# Patient Record
Sex: FEMALE | Race: WHITE | Employment: OTHER | URBAN - METROPOLITAN AREA
[De-identification: names, ages, dates, MRNs, and addresses within clinical notes are randomized per-mention and may not be internally consistent; named-entity substitution may affect disease eponyms.]

---

## 2021-12-05 ENCOUNTER — HOSPITAL ENCOUNTER (EMERGENCY)
Facility: CLINIC | Age: 71
Discharge: HOME | End: 2021-12-05
Attending: EMERGENCY MEDICINE | Admitting: EMERGENCY MEDICINE
Payer: COMMERCIAL

## 2021-12-05 ENCOUNTER — APPOINTMENT (OUTPATIENT)
Dept: CT IMAGING | Facility: CLINIC | Age: 71
End: 2021-12-05
Attending: EMERGENCY MEDICINE
Payer: COMMERCIAL

## 2021-12-05 VITALS
WEIGHT: 130 LBS | SYSTOLIC BLOOD PRESSURE: 97 MMHG | HEART RATE: 80 BPM | RESPIRATION RATE: 18 BRPM | DIASTOLIC BLOOD PRESSURE: 56 MMHG | TEMPERATURE: 98.1 F | OXYGEN SATURATION: 96 %

## 2021-12-05 DIAGNOSIS — R55 SYNCOPE, UNSPECIFIED SYNCOPE TYPE: ICD-10-CM

## 2021-12-05 DIAGNOSIS — Z20.822 SUSPECTED COVID-19 VIRUS INFECTION: ICD-10-CM

## 2021-12-05 LAB
ALBUMIN SERPL-MCNC: 3.2 G/DL (ref 3.4–5)
ALP SERPL-CCNC: 87 U/L (ref 40–150)
ALT SERPL W P-5'-P-CCNC: 43 U/L (ref 0–50)
ANION GAP SERPL CALCULATED.3IONS-SCNC: 8 MMOL/L (ref 3–14)
AST SERPL W P-5'-P-CCNC: 58 U/L (ref 0–45)
ATRIAL RATE - MUSE: 76 BPM
BASOPHILS # BLD AUTO: 0 10E3/UL (ref 0–0.2)
BASOPHILS NFR BLD AUTO: 0 %
BILIRUB SERPL-MCNC: 0.7 MG/DL (ref 0.2–1.3)
BUN SERPL-MCNC: 15 MG/DL (ref 7–30)
CALCIUM SERPL-MCNC: 9.2 MG/DL (ref 8.5–10.1)
CHLORIDE BLD-SCNC: 103 MMOL/L (ref 94–109)
CO2 SERPL-SCNC: 24 MMOL/L (ref 20–32)
CREAT SERPL-MCNC: 0.52 MG/DL (ref 0.52–1.04)
D DIMER PPP FEU-MCNC: 0.77 UG/ML FEU (ref 0–0.5)
DIASTOLIC BLOOD PRESSURE - MUSE: NORMAL MMHG
EOSINOPHIL # BLD AUTO: 0 10E3/UL (ref 0–0.7)
EOSINOPHIL NFR BLD AUTO: 0 %
ERYTHROCYTE [DISTWIDTH] IN BLOOD BY AUTOMATED COUNT: 13.7 % (ref 10–15)
FLUAV RNA SPEC QL NAA+PROBE: POSITIVE
FLUBV RNA RESP QL NAA+PROBE: NEGATIVE
GFR SERPL CREATININE-BSD FRML MDRD: >90 ML/MIN/1.73M2
GLUCOSE BLD-MCNC: 128 MG/DL (ref 70–99)
HCT VFR BLD AUTO: 42.4 % (ref 35–47)
HGB BLD-MCNC: 13.7 G/DL (ref 11.7–15.7)
IMM GRANULOCYTES # BLD: 0 10E3/UL
IMM GRANULOCYTES NFR BLD: 0 %
INTERPRETATION ECG - MUSE: NORMAL
LYMPHOCYTES # BLD AUTO: 1.1 10E3/UL (ref 0.8–5.3)
LYMPHOCYTES NFR BLD AUTO: 14 %
MCH RBC QN AUTO: 28.9 PG (ref 26.5–33)
MCHC RBC AUTO-ENTMCNC: 32.3 G/DL (ref 31.5–36.5)
MCV RBC AUTO: 90 FL (ref 78–100)
MONOCYTES # BLD AUTO: 0.5 10E3/UL (ref 0–1.3)
MONOCYTES NFR BLD AUTO: 6 %
NEUTROPHILS # BLD AUTO: 6.6 10E3/UL (ref 1.6–8.3)
NEUTROPHILS NFR BLD AUTO: 80 %
NRBC # BLD AUTO: 0 10E3/UL
NRBC BLD AUTO-RTO: 0 /100
P AXIS - MUSE: 69 DEGREES
PLATELET # BLD AUTO: 205 10E3/UL (ref 150–450)
POTASSIUM BLD-SCNC: 4.7 MMOL/L (ref 3.4–5.3)
PR INTERVAL - MUSE: 118 MS
PROT SERPL-MCNC: 7.6 G/DL (ref 6.8–8.8)
QRS DURATION - MUSE: 118 MS
QT - MUSE: 424 MS
QTC - MUSE: 477 MS
R AXIS - MUSE: 55 DEGREES
RBC # BLD AUTO: 4.74 10E6/UL (ref 3.8–5.2)
SARS-COV-2 RNA RESP QL NAA+PROBE: NEGATIVE
SODIUM SERPL-SCNC: 135 MMOL/L (ref 133–144)
SYSTOLIC BLOOD PRESSURE - MUSE: NORMAL MMHG
T AXIS - MUSE: 57 DEGREES
TROPONIN I SERPL HS-MCNC: 4 NG/L
VENTRICULAR RATE- MUSE: 76 BPM
WBC # BLD AUTO: 8.3 10E3/UL (ref 4–11)

## 2021-12-05 PROCEDURE — 258N000003 HC RX IP 258 OP 636: Performed by: EMERGENCY MEDICINE

## 2021-12-05 PROCEDURE — 96360 HYDRATION IV INFUSION INIT: CPT | Mod: 59

## 2021-12-05 PROCEDURE — 87636 SARSCOV2 & INF A&B AMP PRB: CPT | Performed by: EMERGENCY MEDICINE

## 2021-12-05 PROCEDURE — 85004 AUTOMATED DIFF WBC COUNT: CPT | Performed by: EMERGENCY MEDICINE

## 2021-12-05 PROCEDURE — 96361 HYDRATE IV INFUSION ADD-ON: CPT

## 2021-12-05 PROCEDURE — 93005 ELECTROCARDIOGRAM TRACING: CPT

## 2021-12-05 PROCEDURE — 99285 EMERGENCY DEPT VISIT HI MDM: CPT | Mod: 25

## 2021-12-05 PROCEDURE — 250N000009 HC RX 250: Performed by: EMERGENCY MEDICINE

## 2021-12-05 PROCEDURE — 85379 FIBRIN DEGRADATION QUANT: CPT | Performed by: EMERGENCY MEDICINE

## 2021-12-05 PROCEDURE — 36415 COLL VENOUS BLD VENIPUNCTURE: CPT | Performed by: EMERGENCY MEDICINE

## 2021-12-05 PROCEDURE — 250N000011 HC RX IP 250 OP 636: Performed by: EMERGENCY MEDICINE

## 2021-12-05 PROCEDURE — 84484 ASSAY OF TROPONIN QUANT: CPT | Performed by: EMERGENCY MEDICINE

## 2021-12-05 PROCEDURE — C9803 HOPD COVID-19 SPEC COLLECT: HCPCS

## 2021-12-05 PROCEDURE — 71275 CT ANGIOGRAPHY CHEST: CPT

## 2021-12-05 PROCEDURE — 82040 ASSAY OF SERUM ALBUMIN: CPT | Performed by: EMERGENCY MEDICINE

## 2021-12-05 RX ORDER — IOPAMIDOL 755 MG/ML
57 INJECTION, SOLUTION INTRAVASCULAR ONCE
Status: COMPLETED | OUTPATIENT
Start: 2021-12-05 | End: 2021-12-05

## 2021-12-05 RX ADMIN — SODIUM CHLORIDE 84 ML: 900 INJECTION INTRAVENOUS at 15:57

## 2021-12-05 RX ADMIN — SODIUM CHLORIDE 1000 ML: 9 INJECTION, SOLUTION INTRAVENOUS at 14:54

## 2021-12-05 RX ADMIN — IOPAMIDOL 57 ML: 755 INJECTION, SOLUTION INTRAVENOUS at 15:57

## 2021-12-05 NOTE — ED PROVIDER NOTES
History   Chief Complaint:  Syncope    HPI   History supplemented by electronic chart review and  at bedside    Jessica Foster is a 71 year old female who presents by EMS for evaluation of syncope.  For the past 5 days, she has had a cough, fevers up to 101, and body aches.  She has had COVID-19 vaccine and booster.  She has not had a Covid test recently.  Today, she was at a restaurant with her  and others, and after having been seated for some time, she stood up to go to the bathroom, and suddenly felt lightheaded and woozy, and was eased to the ground where she experienced syncope that her  estimates lasted 45 to 60 seconds.  No shaking activity.  No head injury or other trauma.  She subsequently awoke without specific intervention and now feels back to her recent baseline.  No vomiting or chest pain.  No history of DVT or PE.  No leg swelling.  She has been taking occasional antipyretics but none today.  No history of seizure.  She specifically denies any history of coronary artery disease or cardiac arrhythmias.  No lateralizing weakness.    Review of Systems  All other systems reviewed and negative except as above in HPI.    Allergies:  No Known Allergies     Medications:    No current outpatient medications on file.      Past Medical History:    No past medical history on file.    There are no problems to display for this patient.       Past Surgical History:    No past surgical history on file.     Family History:    family history is not on file.    Social History:  Gathered with family over Thanksgiving.  Here with .    Physical Exam     Patient Vitals for the past 24 hrs:   BP Temp Pulse Resp SpO2 Weight   12/05/21 1652 97/56 -- 80 -- 96 % --   12/05/21 1648 -- -- -- -- 97 % --   12/05/21 1540 -- -- -- -- -- 59 kg (130 lb)   12/05/21 1237 109/67 98.1  F (36.7  C) 68 18 98 % --      Physical Exam  General: Nontoxic appearing woman sitting upright in room 15,  at  bedside  HENT: mucous membranes moist, thyroid nonpalpable  CV: rate as above, regular rhythm, no lower extremity edema, no JVD, palpable symmetric radial pulses, no murmur audible  Resp: normal effort, speaks in full phrases, no stridor, no cough observed  GI: abdomen soft and nontender  MSK: no bony tenderness   Skin: appropriately warm and dry  Neuro: awake, alert, clear speech, fully oriented, face symmetric,  normal, strength and sensation intact in all extr, no nuchal rigidity, ambulatory without ataxia  Psych: cooperative, no evidence of hallucinations    Emergency Department Course   Electrocardiogram  ECG taken at 1321, ECG interpreted at 1340 by ABENA Lockwood MD  Sinus rhythm, possible left atrial enlargement, incomplete right bundle branch block, no comparisons  Rate 76 bpm. MO interval 118. QRS duration 118. QTc 477      Imaging:    CT Chest Pulmonary Embolism w Contrast   Preliminary Result   IMPRESSION:   1.  No pulmonary embolism demonstrated.           Laboratory:  Labs Ordered and Resulted from Time of ED Arrival to Time of ED Departure   COMPREHENSIVE METABOLIC PANEL - Abnormal       Result Value    Sodium 135      Potassium 4.7      Chloride 103      Carbon Dioxide (CO2) 24      Anion Gap 8      Urea Nitrogen 15      Creatinine 0.52      Calcium 9.2      Glucose 128 (*)     Alkaline Phosphatase 87      AST 58 (*)     ALT 43      Protein Total 7.6      Albumin 3.2 (*)     Bilirubin Total 0.7      GFR Estimate >90     D DIMER QUANTITATIVE - Abnormal    D-Dimer Quantitative 0.77 (*)    TROPONIN I - Normal    Troponin I High Sensitivity 4     CBC WITH PLATELETS AND DIFFERENTIAL    WBC Count 8.3      RBC Count 4.74      Hemoglobin 13.7      Hematocrit 42.4      MCV 90      MCH 28.9      MCHC 32.3      RDW 13.7      Platelet Count 205      % Neutrophils 80      % Lymphocytes 14      % Monocytes 6      % Eosinophils 0      % Basophils 0      % Immature Granulocytes 0      NRBCs per 100 WBC 0       Absolute Neutrophils 6.6      Absolute Lymphocytes 1.1      Absolute Monocytes 0.5      Absolute Eosinophils 0.0      Absolute Basophils 0.0      Absolute Immature Granulocytes 0.0      Absolute NRBCs 0.0        Emergency Department Course:  Reviewed:  I reviewed nursing notes, vitals, and past medical history    Assessments:  I obtained history and examined the patient as noted above.     ED Course as of 12/05/21 1729   Sun Dec 05, 2021   1520 Dimer high, notified pt of plan for CT Chest PE which I just ordered   1602 Spoke with RN, patient currently in CT   1646 I called and updated on test results, will discharge now   1729 I called and spoke with pt's , notified them of +influenza       Consults:   See above in ED Course    Interventions:  Medications   0.9% sodium chloride BOLUS (0 mLs Intravenous Stopped 12/5/21 1648)   iopamidol (ISOVUE-370) solution 57 mL (57 mLs Intravenous Given 12/5/21 1557)   sodium chloride 0.9 % bag 500mL for CT scan flush use (84 mLs Intravenous Given 12/5/21 1557)      Disposition:  Discharged home with     Impression & Plan    Covid-19  Laney Tavares was evaluated during a global COVID-19 pandemic, which necessitated consideration that the patient might be at risk for infection with the SARS-CoV-2 virus that causes COVID-19.   Applicable protocols for evaluation were followed during the patient's care.   COVID-19 was considered as part of the patient's evaluation.     Medical Decision Making:  I think her syncope was likely due to transient orthostatic hypotension in the setting of sudden change in position with some degree of dehydration from poor oral intake lately. Vital signs here are satisfactory. She describes recent symptoms highly suggestive of a viral upper respiratory condition. She is vaccinating as COVID-19, though this remains on the differential, and a test was sent. Out of concern for potential hypercoagulable state, D-dimer was sent which was abnormal  leading to CT which does not show pulmonary embolism, pulmonary infiltrate, or other structural explanation for her syncope. She not have palpitations or other features to raise higher concern for transient dangerous arrhythmias or other more dangerous possibilities. The diagnostic uncertainty as well as reassurances were reviewed with the patient and her , both of whom are comfortable with the plan for discharge home and close outpatient follow-up. She was advised to isolate herself at home while she has these respiratory symptoms to minimize risk of transmitting a presumably contagious illness.    Diagnosis:    ICD-10-CM    1. Syncope, unspecified syncope type  R55    2. Suspected COVID-19 virus infection  Z20.822         12/5/2021   ABENA Lockwood MD    This note was completed in part using Dragon voice recognition software. Although reviewed after completion, some word and grammatical errors may occur.           Nando Lockwood MD  12/05/21 9770

## 2021-12-05 NOTE — ED TRIAGE NOTES
Pt has had cold like symptoms over the last few days;  Was at a restaurant today and had a syncopal episode.  Pt was lowered to the ground and did not suffer any injuries.

## 2023-11-08 ENCOUNTER — OFFICE VISIT (OUTPATIENT)
Dept: PLASTIC SURGERY | Facility: CLINIC | Age: 73
End: 2023-11-08

## 2023-11-08 DIAGNOSIS — Z41.1 ENCOUNTER FOR COSMETIC PROCEDURE: Primary | ICD-10-CM

## 2023-11-08 NOTE — PROGRESS NOTES
Jessica is in for evaluation of  facial aging.  She says she is very unhappy with her neck due to laxity wrinkles and moderately distressed with jawline.  She takes lovastatin she takes nifedipine for Raynaud's phenomenon and she uses PreserVision supplement as she has some early macular degeneration she has had a rotator cuff shoulder surgery without any anesthetic issue she is a non-smoker she does have history of sun exposure she has no known allergies.  She is here with her  we spent 45 minutes consultation today.  She wears her hair fairly short.  She has an average hairline her brows are in good position she has early ptosis more so on the left than the right we talked at length about this.  I told her we would give the name of Dr. Rick Martinez if she wanted to address that.  She has some midface descent some jawline she has a relatively petite mandible.  Although her chin projection is adequate.  She has platysmal banding fine and coarse rhytids in the neck and generalized platysmal descent with laxity.  She could benefit from a neck facelift with a submental incision and platysma surgery.  We talked about the typical periauricular surgery and the incisions.  We talked at great length about potential complications and those that have not even at less than 1/10 of 1%.  Hematoma scarring hair loss motor or sensory nerve issues unacceptable results transient correction we will discussed with her at length if she wants to proceed I would be happy to help her.  If she wanted to have her ptosis surgery corrected at the same time that would be a consideration and our staff would have to work with Dr. Martinez in this regard.  However first she would need to see Dr. Martinez to determine his recommendation.  If she has questions she will be in contact with us.  MAVERICK NOLASCO MD

## 2023-11-08 NOTE — LETTER
November 8, 2023  Re: Jessica Foster  1950    Dear Dr. Cote,    Thank you so much for referring Jessica Foster to the [facility]. I had the pleasure of visiting with Jessica today.     Attached you will find a copy of my note. Please feel free to reach out to me with any questions, [phone].     Jessica is in for evaluation of  facial aging.  She says she is very unhappy with her neck due to laxity wrinkles and moderately distressed with jawline.  She takes lovastatin she takes nifedipine for Raynaud's phenomenon and she uses PreserVision supplement as she has some early macular degeneration she has had a rotator cuff shoulder surgery without any anesthetic issue she is a non-smoker she does have history of sun exposure she has no known allergies.  She is here with her  we spent 45 minutes consultation today.  She wears her hair fairly short.  She has an average hairline her brows are in good position she has early ptosis more so on the left than the right we talked at length about this.  I told her we would give the name of Dr. Rick Martinez if she wanted to address that.  She has some midface descent some jawline she has a relatively petite mandible.  Although her chin projection is adequate.  She has platysmal banding fine and coarse rhytids in the neck and generalized platysmal descent with laxity.  She could benefit from a neck facelift with a submental incision and platysma surgery.  We talked about the typical periauricular surgery and the incisions.  We talked at great length about potential complications and those that have not even at less than 1/10 of 1%.  Hematoma scarring hair loss motor or sensory nerve issues unacceptable results transient correction we will discussed with her at length if she wants to proceed I would be happy to help her.  If she wanted to have her ptosis surgery corrected at the same time that would be a consideration and our staff would have to work  with Dr. Martinez in this regard.  However first she would need to see Dr. Martinez to determine his recommendation.  If she has questions she will be in contact with us.                  Your trust in our practice and care is much appreciated.    Sincerely,    MAVERICK NOLASCO MD

## 2023-11-08 NOTE — LETTER
11/8/2023       RE: Jessica Foster  [address] [place] [place] [address]       Dear Colleague,    Thank you for referring your patient, Jessica Foster, to the [facility] at [facility]. Please see a copy of my visit note below.    Jessica is in for evaluation of  facial aging.  She says she is very unhappy with her neck due to laxity wrinkles and moderately distressed with jawline.  She takes lovastatin she takes nifedipine for Raynaud's phenomenon and she uses PreserVision supplement as she has some early macular degeneration she has had a rotator cuff shoulder surgery without any anesthetic issue she is a non-smoker she does have history of sun exposure she has no known allergies.  She is here with her  we spent 45 minutes consultation today.  She wears her hair fairly short.  She has an average hairline her brows are in good position she has early ptosis more so on the left than the right we talked at length about this.  I told her we would give the name of Dr. Rick Martinez if she wanted to address that.  She has some midface descent some jawline she has a relatively petite mandible.  Although her chin projection is adequate.  She has platysmal banding fine and coarse rhytids in the neck and generalized platysmal descent with laxity.  She could benefit from a neck facelift with a submental incision and platysma surgery.  We talked about the typical periauricular surgery and the incisions.  We talked at great length about potential complications and those that have not even at less than 1/10 of 1%.  Hematoma scarring hair loss motor or sensory nerve issues unacceptable results transient correction we will discussed with her at length if she wants to proceed I would be happy to help her.  If she wanted to have her ptosis surgery corrected at the same time that would be a consideration and our staff would have to work with Dr. Martinez in  this regard.  However first she would need to see Dr. Martinez to determine his recommendation.  If she has questions she will be in contact with us.                Again, thank you for allowing me to participate in the care of your patient.      Sincerely,    MAVERICK NOLASCO MD

## 2023-11-10 NOTE — PROGRESS NOTES
Updated photodocumentation obtained.    Patient given quote for cosmetic procedures at appointment today. Quote was explained in detail, including but not limited to the $500.00 non-refundable deposit due at time of scheduling, when/where payment is due, facility fees being subject to change, and six weeks minimum cancellation notice. Patient verbalized understanding of quote. Signed quote was obtained (see media tab), a copy sent home with pt. Education for quoted procedures was provided both verbally and in educational take home folder including pre & post op care, medications to avoid before and after surgery, and more.     All questions answered at this time. Pt will reach out if questions arise.    Perla Bacon RN  11/10/2023 2:10 PM

## 2023-12-14 RX ORDER — ROSUVASTATIN CALCIUM 10 MG/1
10 TABLET, COATED ORAL DAILY
COMMUNITY
Start: 2023-10-25

## 2023-12-14 RX ORDER — NIFEDIPINE 60 MG/1
60 TABLET, EXTENDED RELEASE ORAL
COMMUNITY
Start: 2022-01-11

## 2024-04-20 DIAGNOSIS — Z98.890 POSTOPERATIVE STATE: Primary | ICD-10-CM

## 2024-04-20 RX ORDER — ONDANSETRON 4 MG/1
8 TABLET, ORALLY DISINTEGRATING ORAL EVERY 8 HOURS PRN
Qty: 12 TABLET | Refills: 1 | Status: CANCELLED | OUTPATIENT
Start: 2024-04-20

## 2024-04-20 RX ORDER — OXYCODONE HYDROCHLORIDE 5 MG/1
5 TABLET ORAL EVERY 6 HOURS PRN
Qty: 20 TABLET | Refills: 0 | Status: CANCELLED | OUTPATIENT
Start: 2024-04-20

## 2024-04-20 RX ORDER — ACETAMINOPHEN 500 MG
500 TABLET ORAL EVERY 6 HOURS
Qty: 12 TABLET | Refills: 0 | Status: CANCELLED | OUTPATIENT
Start: 2024-04-20 | End: 2024-04-23

## 2024-04-23 RX ORDER — ONDANSETRON 4 MG/1
8 TABLET, ORALLY DISINTEGRATING ORAL EVERY 8 HOURS PRN
Qty: 12 TABLET | Refills: 1 | Status: SHIPPED | OUTPATIENT
Start: 2024-04-23

## 2024-04-23 RX ORDER — OXYCODONE HYDROCHLORIDE 5 MG/1
5 TABLET ORAL EVERY 6 HOURS PRN
Qty: 20 TABLET | Refills: 0 | Status: SHIPPED | OUTPATIENT
Start: 2024-04-23

## 2024-04-23 RX ORDER — ACETAMINOPHEN 500 MG
500 TABLET ORAL EVERY 6 HOURS
Qty: 12 TABLET | Refills: 0 | Status: SHIPPED | OUTPATIENT
Start: 2024-04-23 | End: 2024-04-26

## 2024-04-24 ENCOUNTER — TRANSFERRED RECORDS (OUTPATIENT)
Dept: HEALTH INFORMATION MANAGEMENT | Facility: CLINIC | Age: 74
End: 2024-04-24
Payer: COMMERCIAL

## 2024-04-25 ENCOUNTER — OFFICE VISIT (OUTPATIENT)
Dept: PLASTIC SURGERY | Facility: CLINIC | Age: 74
End: 2024-04-25

## 2024-04-25 DIAGNOSIS — Z98.890 POSTOPERATIVE STATE: Primary | ICD-10-CM

## 2024-04-25 NOTE — NURSING NOTE
Patient presents for POD#1 Deep Plane Face/Neck Lift.    Her pain is well controlled with use of Oxycodone.  No N/V.  Patient felt more comfortable after coban, foam, gauze and Jim Prat drained removed.  5-10mL of sanguineous per drain over night.      Performed and instructed patient and her  on the cleaning process and Aquaphor application twice daily.  Applied clean gauze around ears and under chin and applied Jaw Bra, she is to wear this for one day.  Patient can shower tomorrow morning.    Educated patient and  on Oxycodone prn use as well as prn Zofran.  Tylenol take every 6 hours.    Appt scheduled for May 1st for suture removal.    Very ronnie patient.    Arsenio Joel RN  4/25/2024 9:59 AM

## 2024-05-01 ENCOUNTER — OFFICE VISIT (OUTPATIENT)
Dept: PLASTIC SURGERY | Facility: CLINIC | Age: 74
End: 2024-05-01

## 2024-05-01 DIAGNOSIS — Z98.890 POSTOPERATIVE STATE: Primary | ICD-10-CM

## 2024-05-06 NOTE — PROGRESS NOTES
Saw Jessica one week s/p deep plane face and neck lift (4/24/24).    All sutures and staples removed from incisions without difficulty, spare two sutures remaining in bilateral ear incisions. Incisions are well approximated. She has been diligent with her incision cares. She is healing very well! She has minimal bruising and swelling.    We discussed continued cares not limited to continued cleansing with 1:1 hydrogen peroxide and water, frequent application of Aquaphor, sun protection and the future use of silicone scar gel and gentle massage. Pt verbalized understanding of this.    All questions answered at this time. Pt instructed to reach out if questions or concerns arise.    Follow-up scheduled.    Perla Bacon RN

## 2024-05-08 ENCOUNTER — OFFICE VISIT (OUTPATIENT)
Dept: PLASTIC SURGERY | Facility: CLINIC | Age: 74
End: 2024-05-08

## 2024-05-08 DIAGNOSIS — Z98.890 POSTOPERATIVE STATE: Primary | ICD-10-CM

## 2024-05-09 NOTE — PROGRESS NOTES
Saw Jessica two weeks s/p deep plane face and neck lift (4/24/24).    Remaining sutures removed from incisions without difficulty. Incisions are continuing to heal very well. Pt has no concerns, she is pleased with her result this far. We discussed the importance of sun protection and the future use of silicone scar gel and gentle massage. Pt verbalized understanding of this.    All questions answered at this time. Pt instructed to reach out if questions or concerns arise.    Follow-up appt made.    Perla Bacon RN  5/9/2024 3:17 PM

## 2024-06-20 ENCOUNTER — OFFICE VISIT (OUTPATIENT)
Dept: PLASTIC SURGERY | Facility: CLINIC | Age: 74
End: 2024-06-20

## 2024-06-20 DIAGNOSIS — Z98.890 POSTOPERATIVE STATE: Primary | ICD-10-CM

## 2024-06-20 NOTE — LETTER
June 20, 2024  Re: Jessica Foster  1950    Dear Dr. Jerry,      Thank you so much for referring Jessica Foster to the [facility]. I had the pleasure of visiting with Jessica today.     Attached you will find a copy of my note. Please feel free to reach out to me with any questions, [phone].     Jessica is back following her surgery she is absolutely delighted with the facelift.  Her scars are healing very favorably and she has an outstanding contour improvement that she is very pleased and is impressed by the quality of vulvar staff and her concern for patient's recovery.  Will get photos today and would like to use them for teaching purposes and we have encouraged her to a online review.  She will see me on an as-needed basis.        Your trust in our practice and care is much appreciated.    Sincerely,    MAVERICK NOLASCO MD

## 2024-06-20 NOTE — PROGRESS NOTES
Jessica is back following her surgery she is absolutely delighted with the facelift.  Her scars are healing very favorably and she has an outstanding contour improvement that she is very pleased and is impressed by the quality of vulvar staff and her concern for patient's recovery.  Will get photos today and would like to use them for teaching purposes and we have encouraged her to a online review.  She will see me on an as-needed basis.MAVERICK NOLASCO MD

## 2024-06-20 NOTE — LETTER
6/20/2024       RE: Jessica oFster  [address] [place] [address]       Dear Colleague,    Thank you for referring your patient, Jessica Foster, to the [facility] at [facility]. Please see a copy of my visit note below.    Jessica is back following her surgery she is absolutely delighted with the facelift.  Her scars are healing very favorably and she has an outstanding contour improvement that she is very pleased and is impressed by the quality of vulvar staff and her concern for patient's recovery.  Will get photos today and would like to use them for teaching purposes and we have encouraged her to a online review.  She will see me on an as-needed basis.      Again, thank you for allowing me to participate in the care of your patient.      Sincerely,    MAVERICK NOLASCO MD